# Patient Record
Sex: MALE | Race: WHITE | Employment: UNEMPLOYED | URBAN - NONMETROPOLITAN AREA
[De-identification: names, ages, dates, MRNs, and addresses within clinical notes are randomized per-mention and may not be internally consistent; named-entity substitution may affect disease eponyms.]

---
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
## 2021-09-28 ENCOUNTER — TELEPHONE (OUTPATIENT)
Dept: FAMILY MEDICINE CLINIC | Age: 21
End: 2021-09-28

## 2021-09-28 NOTE — TELEPHONE ENCOUNTER
----- Message from Agapito Garcia sent at 9/28/2021  2:24 PM EDT -----  Subject: Appointment Request    Reason for Call: Routine (Patient Request) No Script    QUESTIONS  Type of Appointment? Established Patient  Reason for appointment request? Available appointments did not meet   patient need  Additional Information for Provider? PT would like to see Dr. Matti Carrasquillo check to   see if he has a Heart murmur Pt was recently at urgent care receiving   covid testing and nurse told him to get that checked out. Pts rapid covid   test was negative and only taken due to exposure. Pt needs an In office   visit.   ---------------------------------------------------------------------------  --------------  [facility]  What is the best way for the office to contact you? OK to leave message on   voicemail  Preferred Call Back Phone Number? [phone]  ---------------------------------------------------------------------------  --------------  SCRIPT ANSWERS  Relationship to Patient? Self  (Is the patient requesting to see the provider for a procedure?)? No  (Is the patient requesting to see the provider urgently  today or   tomorrow. )? No  Have you been diagnosed with, awaiting test results for, or told that you   are suspected of having COVID-19 (Coronavirus)? (If patient has tested   negative or was tested as a requirement for work, school, or travel and   not based on symptoms, answer no)? No  Within the past two weeks have you developed any of the following symptoms   (answer no if symptoms have been present longer than 2 weeks or began   more than 2 weeks ago)? Fever or Chills, Cough, Shortness of breath or   difficulty breathing, Loss of taste or smell, Sore throat, Nasal   congestion, Sneezing or runny nose, Fatigue or generalized body aches   (answer no if pain is specific to a body part e.g. back pain), Diarrhea,   Headache? No  Have you had close contact with someone with COVID-19 in the last 14 days?    Yes

## 2021-10-11 ENCOUNTER — TELEPHONE (OUTPATIENT)
Dept: FAMILY MEDICINE CLINIC | Age: 21
End: 2021-10-11

## 2021-10-11 ENCOUNTER — OFFICE VISIT (OUTPATIENT)
Dept: FAMILY MEDICINE CLINIC | Age: 21
End: 2021-10-11
Payer: COMMERCIAL

## 2021-10-11 VITALS
WEIGHT: 188.6 LBS | BODY MASS INDEX: 28.58 KG/M2 | HEIGHT: 68 IN | HEART RATE: 66 BPM | OXYGEN SATURATION: 98 % | DIASTOLIC BLOOD PRESSURE: 64 MMHG | SYSTOLIC BLOOD PRESSURE: 136 MMHG

## 2021-10-11 DIAGNOSIS — R01.1 MURMUR, HEART: Primary | ICD-10-CM

## 2021-10-11 PROCEDURE — 99213 OFFICE O/P EST LOW 20 MIN: CPT | Performed by: FAMILY MEDICINE

## 2021-10-11 ASSESSMENT — PATIENT HEALTH QUESTIONNAIRE - PHQ9
1. LITTLE INTEREST OR PLEASURE IN DOING THINGS: 0
2. FEELING DOWN, DEPRESSED OR HOPELESS: 0
SUM OF ALL RESPONSES TO PHQ QUESTIONS 1-9: 0
SUM OF ALL RESPONSES TO PHQ QUESTIONS 1-9: 0
SUM OF ALL RESPONSES TO PHQ9 QUESTIONS 1 & 2: 0
SUM OF ALL RESPONSES TO PHQ QUESTIONS 1-9: 0

## 2021-10-11 ASSESSMENT — ENCOUNTER SYMPTOMS: SHORTNESS OF BREATH: 0

## 2021-10-11 NOTE — PROGRESS NOTES
Chief Complaint   Patient presents with    Heart Murmur       HPI:  Nam Langley is a 24 y.o. (: 2000) here today   for follow up on possible heart murmur. HPI  Was at [facility] in [place] for covid test.  Had prior exposure to covid. Test was negative. ? Murmur on exam.    No sig dizziness, lightheadedness, passing out w/ activity. Has been consuming energy drink and/or pre-workout, approx daily. Patient's medications, allergies, past medical, surgical, social and family histories were reviewed and updated as appropriate. ROS:  Review of Systems   Constitutional: Negative for fever. Respiratory: Negative for shortness of breath. Cardiovascular: Negative for chest pain and palpitations. Neurological: Negative for dizziness and light-headedness. No results found for: LABA1C, LABMICR, LDLCALC    No past medical history on file. Family History   Problem Relation Age of Onset    Heart Disease Maternal Grandfather        Social History     Socioeconomic History    Marital status: Single     Spouse name: Not on file    Number of children: Not on file    Years of education: Not on file    Highest education level: Not on file   Occupational History    Not on file   Tobacco Use    Smoking status: Never Smoker    Smokeless tobacco: Never Used   Substance and Sexual Activity    Alcohol use: No    Drug use: No    Sexual activity: Not on file   Other Topics Concern    Not on file   Social History Narrative    Not on file     Social Determinants of Health     Financial Resource Strain:     Difficulty of Paying Living Expenses:    Food Insecurity:     Worried About Running Out of Food in the Last Year:     [address] in the Last Year:    Transportation Needs:     Lack of Transportation (Medical):      Lack of Transportation (Non-Medical):    Physical Activity:     Days of Exercise per Week:     Minutes of Exercise per Session:    Stress:     Feeling of Stress :    Social Connections:     Frequency of Communication with Friends and Family:     Frequency of Social Gatherings with Friends and Family:     Attends Mu-[employer] Services:     Active Member of Clubs or Organizations:     Attends Club or Organization Meetings:     Marital Status:    Intimate Partner Violence:     Fear of Current or Ex-Partner:     Emotionally Abused:     Physically Abused:     Sexually Abused:        Prior to Visit Medications    Not on File       No Known Allergies    OBJECTIVE:    /64   Pulse 66   Ht 5' 8\" (1.727 m)   Wt 188 lb 9.6 oz (85.5 kg)   SpO2 98%   BMI 28.68 kg/m²     BP Readings from Last 2 Encounters:   10/11/21 136/64   08/08/18 110/68       Wt Readings from Last 3 Encounters:   10/11/21 188 lb 9.6 oz (85.5 kg)   08/08/18 177 lb 3.2 oz (80.4 kg) (84 %, Z= 0.98)*   06/25/18 173 lb (78.5 kg) (81 %, Z= 0.87)*     * Growth percentiles are based on [facility] (Boys, 2-20 Years) data. Physical Exam  Constitutional:       Appearance: Normal appearance. HENT:      Head: Normocephalic and atraumatic. Eyes:      Extraocular Movements: Extraocular movements intact. Cardiovascular:      Rate and Rhythm: Normal rate and regular rhythm. Heart sounds: Murmur (2/6 systolic murmur heard best over mitral area. no sig change w/ squatting. ) heard. Pulmonary:      Effort: Pulmonary effort is normal.      Breath sounds: Normal breath sounds. Skin:     General: Skin is warm and dry. Neurological:      General: No focal deficit present. Mental Status: He is alert and oriented to person, place, and time. Psychiatric:         Mood and Affect: Mood normal.         Behavior: Behavior normal.           ASSESSMENT/PLAN:    1. Murmur, heart  Murmur noted on exam.  Sounds benign, but, given collegiate sports and strenuous activity, rec echo for further evaluation. Murmur not previously documented, so appears new.     - ECHO Complete 2D W Doppler W Color; Future

## 2021-11-15 ENCOUNTER — TELEPHONE (OUTPATIENT)
Dept: FAMILY MEDICINE CLINIC | Age: 21
End: 2021-11-15

## 2021-11-15 NOTE — TELEPHONE ENCOUNTER
Family calling for echo results. I do not see them so I called [facility] and had to leave a message on their vm to please fax results to us.

## 2021-11-19 NOTE — RESULT ENCOUNTER NOTE
Minimal regurgitation to a couple of valves. These are not worrisome findings. EF and wall motion normal.  No worrisome finding on echo.

## 2021-11-19 NOTE — TELEPHONE ENCOUNTER
Called [facility] MR spoke to Jeffrey Pierre she said they are in the process of moving and they did not receive the request. I re-faxed the request and put STAT on it to get it sooner.

## 2023-05-24 ENCOUNTER — OFFICE VISIT (OUTPATIENT)
Dept: ENT CLINIC | Age: 23
End: 2023-05-24
Payer: COMMERCIAL

## 2023-05-24 ENCOUNTER — PROCEDURE VISIT (OUTPATIENT)
Dept: AUDIOLOGY | Age: 23
End: 2023-05-24
Payer: COMMERCIAL

## 2023-05-24 VITALS
HEIGHT: 69 IN | WEIGHT: 190 LBS | BODY MASS INDEX: 28.14 KG/M2 | OXYGEN SATURATION: 96 % | SYSTOLIC BLOOD PRESSURE: 124 MMHG | TEMPERATURE: 96.9 F | HEART RATE: 60 BPM | DIASTOLIC BLOOD PRESSURE: 78 MMHG

## 2023-05-24 DIAGNOSIS — H73.893 TYMPANIC MEMBRANE RETRACTION, BILATERAL: Primary | ICD-10-CM

## 2023-05-24 DIAGNOSIS — Z01.10 ENCOUNTER FOR HEARING EXAMINATION WITHOUT ABNORMAL FINDINGS: Primary | ICD-10-CM

## 2023-05-24 PROCEDURE — 99202 OFFICE O/P NEW SF 15 MIN: CPT | Performed by: OTOLARYNGOLOGY

## 2023-05-24 PROCEDURE — 92567 TYMPANOMETRY: CPT | Performed by: AUDIOLOGIST

## 2023-05-24 PROCEDURE — 92557 COMPREHENSIVE HEARING TEST: CPT | Performed by: AUDIOLOGIST

## 2023-05-24 ASSESSMENT — ENCOUNTER SYMPTOMS
SHORTNESS OF BREATH: 0
ABDOMINAL PAIN: 0
WHEEZING: 0

## 2023-05-24 NOTE — PROGRESS NOTES
[facility] [address]  [place], [place] [place]  P: [phone]       Patient     Burton Moser  2000    Chief Concern     Chief Complaint   Patient presents with    New Patient     Patient is here today to establish care. Patient states he is applying for [place] [employer] and he need an audiogram. Patient has no issues with his ear, nose or throat. No history of any of those issues either. Assessment and Plan      Diagnosis Orders   1. Tympanic membrane retraction, bilateral          Mr. Behzad Srinivasan is a pleasant 20-year-old male with no history of chronic middle ear disease. Audiometric testing shows normal pure-tone thresholds and word recognition scores, however he has type C tympanograms bilaterally, and indeed on examination he has pars flaccida retraction pockets without any epithelial ingrowth/cholesteatoma. We have counseled him to follow-up with any further hearing loss, ear fullness, otorrhea or vertigo. Return if symptoms worsen or fail to improve. History of Present Illness     Patient is a 20-year-old male applying for the [facility], needs otologic clearance. Denies hearing loss, tinnitus, otalgia, otorrhea or vertigo. No rhinitis, sinus pressure/pain. . EtOH 3-4 drinks/week, no chronic middle ear disease    Past Medical History     No past medical history on file. Past Surgical History     Past Surgical History:   Procedure Laterality Date    MOUTH SURGERY  08/2021    implant       Family History     Family History   Problem Relation Age of Onset    Heart Disease Maternal Grandfather        Social History     Social History     Tobacco Use    Smoking status: Never    Smokeless tobacco: Current     Types: Chew   Vaping Use    Vaping Use: Never used   Substance Use Topics    Alcohol use: Yes     Comment: social    Drug use: No        Allergies     No Known Allergies    Medications     No current outpatient medications on file.      No current

## 2023-05-24 NOTE — PROGRESS NOTES
Deana Doll Lung   2000, 25 y.o. male   [id number]       Referring Provider: Nancy Amezquita MD  Referral Type: In an order in [address] [place] [address]    Reason for Visit:  Physical for work application    ADULT AUDIOLOGIC EVALUATION      Keny Vivas is a 25 y.o. male seen today, 5/24/2023 , for an initial audiologic evaluation. Patient was seen by Nancy Amezquita MD following today's evaluation. AUDIOLOGIC AND OTHER PERTINENT MEDICAL HISTORY:      Keny Vivas reports no significant concerns with his hearing. He is here today for an evaluation to apply for the [facility] [employer]. No other significant otologic history reported. He denied otalgia, aural fullness, otorrhea, tinnitus, dizziness, imbalance, history of falls, history of significant noise exposure, history of head trauma, history of ear surgery, and family history of hearing loss. Date: 5/24/2023     IMPRESSIONS:      Today's results revealed normal hearing sensitivity, bilaterally. Excellent speech understanding when in quiet. Tympanometry indicates  negative pressure, bilaterally. Discussed test results and implications with patient. Discussed noise exposure and the importance of appropriate hearing protection when in hazardous noise environments. Follow medical recommendations of Nancy Amezquita MD.     ASSESSMENT AND FINDINGS:     Otoscopy unremarkable. RIGHT EAR:  Hearing Sensitivity:Hearing sensitivity within normal limits from 250-8000 Hz  Speech Recognition Threshold: 15 dB HL  Word Recognition: Excellent 100%, based on NU-6 25-word list at 55 dBHL using recorded speech stimuli. Tympanometry: Negative peak pressure with normal compliance, Type C tympanogram, consistent with ETD/history of otitis media. Volume 1.5 cm3, Peak -348 daPa, 0.44 mmho.       LEFT EAR:  Hearing Sensitivity:Hearing sensitivity within normal limits from 250-8000 Hz  Speech Recognition Threshold: 15 dB HL  Word Recognition: Excellent 100%, based on NU-6 25-word list at 55 dBHL